# Patient Record
Sex: FEMALE | Race: WHITE | NOT HISPANIC OR LATINO | Employment: OTHER | URBAN - METROPOLITAN AREA
[De-identification: names, ages, dates, MRNs, and addresses within clinical notes are randomized per-mention and may not be internally consistent; named-entity substitution may affect disease eponyms.]

---

## 2017-05-15 ENCOUNTER — NON-PROVIDER VISIT (OUTPATIENT)
Dept: WOUND CARE | Facility: MEDICAL CENTER | Age: 77
End: 2017-05-15
Attending: NURSE PRACTITIONER
Payer: MEDICARE

## 2017-05-15 NOTE — WOUND TEAM
"Wound Team    Moraima Wilson (MR# [id number])         Wound Team Info      Author Note Status Last Update User Last Update Date/Time     Sneha Valladares R.N. Signed Sneha Valladares R.N.        Wound Team      Expand All Collapse All    Advanced Wound Care  [facility]  [address]  [place] [address]  [phone] Fax#    Initial Ostomy Evaluation  For 90 Day Certification Period:  05/15/2017-08/15/2017    Referring Provider:    Primary Provider:   Consulting Providers:  Surgeon:      Start of Care: 05/15/2017  Reason for referral: Appliances have been leaking, causing pain and peristomal skin breakdown.    SUBJECTIVE:  Follow up with present pouch system due to leakage and lasting one to three days.      HPI: A 74 year old white female was referred to the wound and ostomy clinic for assessment of her stoma, peristomal skin and appliance.    PMH: Insomnia, esophageal reflux, hypothyroidism, ulcerative colitis history, GERD, diarrhea, B12 deficiency, polio at age 8 years, asthma, OB, anxiety.    Surgical Hx: Total colectomy and ileostomy secondary to severe ulcerative colitis in 1965. Lumbar laminectomy, thyroidectomy, kidney stone removal from left kidney, D & C    Medications:   Allergies: Quinine sulfate, platelets drop, salt citric acid preservatives (The patient said that she breaks out in hives).    Soc. Never a smoker, negative for Drug/Alcohol usage. .    OBJECTIVE: To provide a pouching system that will not leak or find the pouch problem that is causing the leakage of stool.    STOMA ASSESSMENT:              Type:  _X___Ileostomy     ____Colostomy    ____Urostomy    ____Other                Location:   Right lower abdomen              Size: 3/4\"              Shape: Round              Color: Moist and red              Protrudes:      _X__ >1 inch                             OS:  Center              Mucocutaneous Junction: Resolved              Skin " "indentations, creases or scar tissue: Peristomal skin is retracted when the patient sits up, the stoma still protrudes above the skin level. The surrounding skin protrudes over the superior aspect of the stoma from the abdomen due to weight loss which is pinched off leaving a gap in the wafer. The patient will need deep convexity to seal around the stoma and prevent leakage and skin problems.              Angie-stomal Skin Problems: Red irritation              Effluent / Flatus: loose brown              Photo  __X__ Yes ____ No    Pouching Procedure:              Old appliance removed: A one piece convex presized 7/8\" opening Nu-Hope drainage pouch with [employer] paste removed.                 Peristomal skin cleansed with: Warm water and pat dry.              Peristomal skin treated with: Crusted with no-sting skin prep and stoma powder x2.              Ostomy appliance used:  Applied a one piece Nu-Hope with a convex presized 7/8\" opening, #  S-C SP, used paste ring for additional adhesion and flexibility with skin.           Patient Education: Verbal/demonstration instructions of above pouching procedure provided to patient/family. Demonstrated to patient how to crust peristomal skin, then had patient perform crusting on the back of this RN glove. Demonstrated how to use paste ring. Patient using belt to help secure appliance. Patient can order no sting and paste rings through [employer].                           PLAN See above    Supplies Needed:              Appliance type:  Other: Nu-Hope one piece convex presized 7/8\" opening fecal pouch.                Accessories:   Paste ring and ostomy belt                Supplier: [employer]    Frequency:  Changes the pouch system 2x's per week or prn leakage.      Professional Collaboration: None at this time.      At the time of each visit, a thorough assessment of the patient is completed to assure appropriateness of our plan of care.  The plan of care may need to be " adapted from the original plan of care to address any significant changes in patient status.    Clinician Signature:  ________________________________  Date:  ____________    Physician Signature:  ________________________________  Date:  ___________

## 2017-06-01 ENCOUNTER — APPOINTMENT (OUTPATIENT)
Dept: WOUND CARE | Facility: MEDICAL CENTER | Age: 77
End: 2017-06-01
Attending: NURSE PRACTITIONER
Payer: MEDICARE

## 2017-10-19 PROBLEM — R55 SYNCOPE AND COLLAPSE: Status: ACTIVE | Noted: 2017-10-19

## 2017-10-19 PROBLEM — E86.0 DEHYDRATION: Status: ACTIVE | Noted: 2017-10-19

## 2017-10-19 PROBLEM — S32.591A BILATERAL PUBIC RAMI FRACTURES (HCC): Status: ACTIVE | Noted: 2017-10-19

## 2017-10-19 PROBLEM — N39.0 UTI (URINARY TRACT INFECTION): Status: ACTIVE | Noted: 2017-10-19

## 2017-10-19 PROBLEM — S32.592A BILATERAL PUBIC RAMI FRACTURES (HCC): Status: ACTIVE | Noted: 2017-10-19

## 2017-12-07 PROBLEM — Z96.631: Status: ACTIVE | Noted: 2017-12-07

## 2017-12-07 PROBLEM — Z47.1: Status: ACTIVE | Noted: 2017-12-07

## 2018-02-06 ENCOUNTER — TELEPHONE (OUTPATIENT)
Dept: CARDIOLOGY | Facility: CLINIC | Age: 78
End: 2018-02-06

## 2018-02-06 NOTE — TELEPHONE ENCOUNTER
LVM for patient to call me back for a few questions prior to her appointment with CW in  on 2/13/18.

## 2018-02-13 ENCOUNTER — OFFICE VISIT (OUTPATIENT)
Dept: CARDIOLOGY | Facility: CLINIC | Age: 78
End: 2018-02-13
Payer: MEDICARE

## 2018-02-13 VITALS
HEART RATE: 72 BPM | SYSTOLIC BLOOD PRESSURE: 100 MMHG | WEIGHT: 120 LBS | OXYGEN SATURATION: 91 % | DIASTOLIC BLOOD PRESSURE: 70 MMHG | HEIGHT: 67 IN | BODY MASS INDEX: 18.83 KG/M2

## 2018-02-13 DIAGNOSIS — R55 SYNCOPE AND COLLAPSE: ICD-10-CM

## 2018-02-13 PROCEDURE — 93000 ELECTROCARDIOGRAM COMPLETE: CPT | Performed by: INTERNAL MEDICINE

## 2018-02-13 PROCEDURE — 99214 OFFICE O/P EST MOD 30 MIN: CPT | Performed by: INTERNAL MEDICINE

## 2018-02-13 RX ORDER — ZOLPIDEM TARTRATE 5 MG/1
5 TABLET ORAL NIGHTLY PRN
COMMUNITY
End: 2018-12-14

## 2018-02-13 RX ORDER — OXYBUTYNIN CHLORIDE 5 MG/1
5 TABLET ORAL 3 TIMES DAILY
COMMUNITY
End: 2018-12-14

## 2018-02-13 RX ORDER — SODIUM BICARBONATE 650 MG/1
650 TABLET ORAL 4 TIMES DAILY
COMMUNITY
End: 2018-07-23

## 2018-02-13 ASSESSMENT — ENCOUNTER SYMPTOMS
WEAKNESS: 0
SORE THROAT: 0
FALLS: 1
NERVOUS/ANXIOUS: 1
SHORTNESS OF BREATH: 0
DIZZINESS: 1
DIARRHEA: 1
BLURRED VISION: 1
BRUISES/BLEEDS EASILY: 0
FEVER: 0
LOSS OF CONSCIOUSNESS: 1
ORTHOPNEA: 1
PALPITATIONS: 0
FOCAL WEAKNESS: 0
DEPRESSION: 1
PND: 0
COUGH: 0
MEMORY LOSS: 1
CHILLS: 0
ABDOMINAL PAIN: 0
BACK PAIN: 1
NAUSEA: 0
CLAUDICATION: 0

## 2018-02-13 NOTE — PROGRESS NOTES
Subjective:   Moraima Wilson is a 78 y.o. female who presents today for follow-up with a history of syncope which was felt to do dehydration possible UTI in October 2017 echo and carotid were normal telemetry in the hospital was normal EKG normal    She requires regular infusions of fluid and magnesium given the long history of ileostomy    She does describe on occasion some near syncopal events she felt this was related to her head position    Past Medical History:   Diagnosis Date   • Back pain    • GERD (gastroesophageal reflux disease)    • Ileostomy in place (CMS-HCC)    • Kidney disease     stage 3   • Nephrolithiasis     with obstruction Dec 2016   • Right wrist fracture 9/4/2015    fell walking in driveway   • Thyroid disease    • Ulcerative colitis (CMS-[facility])      Past Surgical History:   Procedure Laterality Date   • STENT PLACEMENT  dec 2016    both kidneys, removed 3 months later   • LUMBAR FUSION POSTERIOR  2005    Dr CHAD Wilson   • ILEOSTOMY  1965   • CARPAL TUNNEL RELEASE     • HAND SURGERY       History reviewed. No pertinent family history.  History   Smoking Status   • Never Smoker   Smokeless Tobacco   • Never Used     Allergies   Allergen Reactions   • Contrast Media With Iodine [Iodine]      Pt has kidney disease     • Quinine      platelets decrease     Outpatient Encounter Prescriptions as of 2/13/2018   Medication Sig Dispense Refill   • zolpidem (AMBIEN) 5 MG Tab Take 5 mg by mouth at bedtime as needed for Sleep.     • sodium bicarbonate (SODIUM BICARBONATE) 650 MG Tab Take 650 mg by mouth 4 times a day.     • CHLORZOXAZONE PO Take  by mouth as needed.     • oxybutynin (DITROPAN) 5 MG Tab Take 5 mg by mouth 3 times a day.     • hydrocodone/acetaminophen (NORCO)  MG Tab Take 1 Tab by mouth every 8 hours as needed for Moderate Pain. 20 Tab 0   • Fe Bisgly-Succ-C-Thre-B12-FA (IRON-150 PO) Take 65 mg by mouth every day.     • busPIRone (BUSPAR) 15 MG tablet Take 15 mg by mouth 2 times a day.      • Cyanocobalamin 2500 MCG Tab Take 1 Tab by mouth every day.     • paroxetine (PAXIL) 20 MG Tab Take 20 mg by mouth every day.     • salmeterol (SEREVENT) 50 MCG/DOSE AEROSOL POWDER, BREATH ACTIVATED Inhale 1 Puff by mouth 2 times a day.     • CALCIUM PO Take 750 mg by mouth every day. OTC     • gabapentin (NEURONTIN) 300 MG Cap Take 300 mg by mouth every day.     • estradiol (VAGIFEM) 10 MCG Tab Insert 10 mcg in vagina 2X A WEEK.     • diphenoxylate-atropine (LOMOTIL) 2.5-0.025 MG Tab Take 1 Tab by mouth 3 times a day as needed for Diarrhea.     • montelukast (SINGULAIR) 10 MG Tab Take 10 mg by mouth every evening.     • vitamin D (CHOLECALCIFEROL) 1000 UNIT Tab Take 2,000 Units by mouth every day.     • estradiol (ESTRACE VAGINAL) 0.1 MG/GM vaginal cream Insert 1 g in vagina every day.     • fluticasone (FLOVENT HFA) 110 MCG/ACT Aerosol Inhale 2 Puffs by mouth 2 times a day.     • levothyroxine (SYNTHROID) 100 MCG Tab Take 100 mcg by mouth Every morning on an empty stomach.     • lactobacillus acidophilus (BACID) Tab Take 1 Tab by mouth 3 times a day, with meals. (Patient not taking: Reported on 2/13/2018) 30 Tab 0   • levofloxacin (LEVAQUIN) 250 MG Tab Take 1 Tab by mouth every 24 hours. (Patient not taking: Reported on 2/13/2018) 5 Tab 0   • magnesium sulfate 2 GM/50ML Solution IVPB premix 2 g by Intravenous route every 7 days. Infusion center patient       No facility-administered encounter medications on file as of 2/13/2018.      Review of Systems   Constitutional: Positive for malaise/fatigue. Negative for chills and fever.   HENT: Positive for hearing loss. Negative for sore throat.    Eyes: Positive for blurred vision.   Respiratory: Negative for cough and shortness of breath.    Cardiovascular: Positive for chest pain and orthopnea. Negative for palpitations, claudication, leg swelling and PND.   Gastrointestinal: Positive for diarrhea. Negative for abdominal pain and nausea.   Genitourinary: Positive  "for frequency and urgency.   Musculoskeletal: Positive for back pain, falls and joint pain.   Skin: Negative for rash.   Neurological: Positive for dizziness and loss of consciousness. Negative for focal weakness and weakness.   Endo/Heme/Allergies: Positive for environmental allergies. Does not bruise/bleed easily.   Psychiatric/Behavioral: Positive for depression and memory loss. The patient is nervous/anxious.         Objective:   /70   Pulse 72   Ht 1.702 m (5' 7\")   Wt 54.4 kg (120 lb)   LMP 03/28/1977 (Approximate)   SpO2 91%   BMI 18.79 kg/m²     Physical Exam   Constitutional: No distress.   HENT:   Mouth/Throat: Oropharynx is clear and moist.   Eyes: No scleral icterus.   Cardiovascular: Normal rate, regular rhythm and intact distal pulses.  Exam reveals no gallop and no friction rub.    No murmur heard.  Pulmonary/Chest: Effort normal and breath sounds normal. She has no rales.   Abdominal: Bowel sounds are normal. There is no tenderness.   Musculoskeletal: She exhibits no edema.   Neurological: She is alert.   Skin: No rash noted. She is not diaphoretic.   Psychiatric: She has a normal mood and affect.     EKG shows sinus rhythm with normal axis and intervals aside from first-degree AV block    Echocardiogram in October showed normal function mild MR    Carotid ultrasound was within normal range    Labs show a long-standing chronic kidney disease overall improved in the past year on therapy    Hypomagnesemia stable on therapy  Assessment:     1. Syncope and collapse  EKG       Medical Decision Making:  Today's Assessment / Status / Plan:     It was my pleasure to meet with Ms. Wilson.    She is accompanied by her sister    For her history of syncope this seemed to be due to dehydration and possible UTI encouraged her to follow up with urology about her concerns for frequent urinary infections    She reports that she is doing well on her infusion therapy with magnesium    Overall have a low " suspicion for arrhythmia causing her syncope certainly she had new symptoms or recurrent syncope we could explore Holter event or loop recorder monitoring    Ms. Wilson does not require regular cardiology follow up, I have advised her to call our office or e-mail using my MyChart if needed.    It is my pleasure to participate in the care of Ms. Wilson.  Please do not hesitate to contact me with questions or concerns.    Huy Vick MD PhD FAC  Cardiologist [facility]

## 2018-02-13 NOTE — LETTER
[facility] [place] [address],   [address]nd [place] [address]  Phone: [phone]  Fax: [phone]              Moraima Wilson  1940    Encounter Date: 2/13/2018    Huy Vick M.D.          PROGRESS NOTE:  Subjective:   Moraima Wilson is a 78 y.o. female who presents today for follow-up with a history of syncope which was felt to do dehydration possible UTI in October 2017 echo and carotid were normal telemetry in the hospital was normal EKG normal    She requires regular infusions of fluid and magnesium given the long history of ileostomy    She does describe on occasion some near syncopal events she felt this was related to her head position    Past Medical History:   Diagnosis Date   • Back pain    • GERD (gastroesophageal reflux disease)    • Ileostomy in place (CMS-HCC)    • Kidney disease     stage 3   • Nephrolithiasis     with obstruction Dec 2016   • Right wrist fracture 9/4/2015    fell walking in driveway   • Thyroid disease    • Ulcerative colitis (CMS-HCC)      Past Surgical History:   Procedure Laterality Date   • STENT PLACEMENT  dec 2016    both kidneys, removed 3 months later   • LUMBAR FUSION POSTERIOR  2005    Dr CHAD Wilson   • ILEOSTOMY  1965   • CARPAL TUNNEL RELEASE     • HAND SURGERY       History reviewed. No pertinent family history.  History   Smoking Status   • Never Smoker   Smokeless Tobacco   • Never Used     Allergies   Allergen Reactions   • Contrast Media With Iodine [Iodine]      Pt has kidney disease     • Quinine      platelets decrease     Outpatient Encounter Prescriptions as of 2/13/2018   Medication Sig Dispense Refill   • zolpidem (AMBIEN) 5 MG Tab Take 5 mg by mouth at bedtime as needed for Sleep.     • sodium bicarbonate (SODIUM BICARBONATE) 650 MG Tab Take 650 mg by mouth 4 times a day.     • CHLORZOXAZONE PO Take  by mouth as needed.     • oxybutynin (DITROPAN) 5 MG Tab Take 5 mg by mouth 3  times a day.     • hydrocodone/acetaminophen (NORCO)  MG Tab Take 1 Tab by mouth every 8 hours as needed for Moderate Pain. 20 Tab 0   • Fe Bisgly-Succ-C-Thre-B12-FA (IRON-150 PO) Take 65 mg by mouth every day.     • busPIRone (BUSPAR) 15 MG tablet Take 15 mg by mouth 2 times a day.     • Cyanocobalamin 2500 MCG Tab Take 1 Tab by mouth every day.     • paroxetine (PAXIL) 20 MG Tab Take 20 mg by mouth every day.     • salmeterol (SEREVENT) 50 MCG/DOSE AEROSOL POWDER, BREATH ACTIVATED Inhale 1 Puff by mouth 2 times a day.     • CALCIUM PO Take 750 mg by mouth every day. OTC     • gabapentin (NEURONTIN) 300 MG Cap Take 300 mg by mouth every day.     • estradiol (VAGIFEM) 10 MCG Tab Insert 10 mcg in vagina 2X A WEEK.     • diphenoxylate-atropine (LOMOTIL) 2.5-0.025 MG Tab Take 1 Tab by mouth 3 times a day as needed for Diarrhea.     • montelukast (SINGULAIR) 10 MG Tab Take 10 mg by mouth every evening.     • vitamin D (CHOLECALCIFEROL) 1000 UNIT Tab Take 2,000 Units by mouth every day.     • estradiol (ESTRACE VAGINAL) 0.1 MG/GM vaginal cream Insert 1 g in vagina every day.     • fluticasone (FLOVENT HFA) 110 MCG/ACT Aerosol Inhale 2 Puffs by mouth 2 times a day.     • levothyroxine (SYNTHROID) 100 MCG Tab Take 100 mcg by mouth Every morning on an empty stomach.     • lactobacillus acidophilus (BACID) Tab Take 1 Tab by mouth 3 times a day, with meals. (Patient not taking: Reported on 2/13/2018) 30 Tab 0   • levofloxacin (LEVAQUIN) 250 MG Tab Take 1 Tab by mouth every 24 hours. (Patient not taking: Reported on 2/13/2018) 5 Tab 0   • magnesium sulfate 2 GM/50ML Solution IVPB premix 2 g by Intravenous route every 7 days. Infusion center patient       No facility-administered encounter medications on file as of 2/13/2018.      Review of Systems   Constitutional: Positive for malaise/fatigue. Negative for chills and fever.   HENT: Positive for hearing loss. Negative for sore throat.    Eyes: Positive for blurred  "vision.   Respiratory: Negative for cough and shortness of breath.    Cardiovascular: Positive for chest pain and orthopnea. Negative for palpitations, claudication, leg swelling and PND.   Gastrointestinal: Positive for diarrhea. Negative for abdominal pain and nausea.   Genitourinary: Positive for frequency and urgency.   Musculoskeletal: Positive for back pain, falls and joint pain.   Skin: Negative for rash.   Neurological: Positive for dizziness and loss of consciousness. Negative for focal weakness and weakness.   Endo/Heme/Allergies: Positive for environmental allergies. Does not bruise/bleed easily.   Psychiatric/Behavioral: Positive for depression and memory loss. The patient is nervous/anxious.         Objective:   /70   Pulse 72   Ht 1.702 m (5' 7\")   Wt 54.4 kg (120 lb)   LMP 03/28/1977 (Approximate)   SpO2 91%   BMI 18.79 kg/m²      Physical Exam   Constitutional: No distress.   HENT:   Mouth/Throat: Oropharynx is clear and moist.   Eyes: No scleral icterus.   Cardiovascular: Normal rate, regular rhythm and intact distal pulses.  Exam reveals no gallop and no friction rub.    No murmur heard.  Pulmonary/Chest: Effort normal and breath sounds normal. She has no rales.   Abdominal: Bowel sounds are normal. There is no tenderness.   Musculoskeletal: She exhibits no edema.   Neurological: She is alert.   Skin: No rash noted. She is not diaphoretic.   Psychiatric: She has a normal mood and affect.     EKG shows sinus rhythm with normal axis and intervals aside from first-degree AV block    Echocardiogram in October showed normal function mild MR    Carotid ultrasound was within normal range    Labs show a long-standing chronic kidney disease overall improved in the past year on therapy    Hypomagnesemia stable on therapy  Assessment:     1. Syncope and collapse  EKG       Medical Decision Making:  Today's Assessment / Status / Plan:     It was my pleasure to meet with Ms. Wilson.    She is " accompanied by her sister    For her history of syncope this seemed to be due to dehydration and possible UTI encouraged her to follow up with urology about her concerns for frequent urinary infections    She reports that she is doing well on her infusion therapy with magnesium    Overall have a low suspicion for arrhythmia causing her syncope certainly she had new symptoms or recurrent syncope we could explore Holter event or loop recorder monitoring    Ms. Wilson does not require regular cardiology follow up, I have advised her to call our office or e-mail using my MyChart if needed.    It is my pleasure to participate in the care of Ms. Wilson.  Please do not hesitate to contact me with questions or concerns.    Huy Vick MD PhD [facility]  Cardiologist [facility] and V[facility]        Paty Brito M.D.  [address] [address]  VIA Facsimile: [phone]     Karla Arora M.D.  [address]  [facility] [address]  VIA Facsimile: [phone]     Harshal Herring M.D.  [address] [address]  VIA Facsimile: [phone]     [facility]  VIA Facsimile: [phone]     Jaiden Cardona M.D.  [address] [address]  VIA Facsimile: [phone]

## 2018-02-14 LAB — EKG IMPRESSION: NORMAL

## 2020-06-22 PROBLEM — E53.8 ADENOSYLCOBALAMIN SYNTHESIS DEFECT: Status: ACTIVE | Noted: 2020-06-22

## 2020-06-22 PROBLEM — E83.42 HYPOMAGNESEMIA: Status: ACTIVE | Noted: 2020-06-22

## 2021-01-12 DIAGNOSIS — Z23 NEED FOR VACCINATION: ICD-10-CM

## 2021-02-26 PROBLEM — K51.90 ULCERATIVE COLITIS (HCC): Status: ACTIVE | Noted: 2020-12-10

## 2021-02-26 PROBLEM — G30.1 LATE ONSET ALZHEIMER'S DISEASE WITH BEHAVIORAL DISTURBANCE (HCC): Status: ACTIVE | Noted: 2021-02-26

## 2021-02-26 PROBLEM — M96.1 FAILED BACK SYNDROME OF LUMBAR SPINE: Status: ACTIVE | Noted: 2020-12-10

## 2021-02-26 PROBLEM — R46.89 WANDERING: Status: ACTIVE | Noted: 2021-02-26

## 2021-02-26 PROBLEM — N18.4 STAGE 4 CHRONIC KIDNEY DISEASE (HCC): Status: ACTIVE | Noted: 2021-02-26

## 2021-02-26 PROBLEM — J45.909 ASTHMA: Status: ACTIVE | Noted: 2021-02-26

## 2021-02-26 PROBLEM — N39.0 UTI (URINARY TRACT INFECTION): Status: RESOLVED | Noted: 2017-10-19 | Resolved: 2021-02-26

## 2021-02-26 PROBLEM — Z96.631: Status: RESOLVED | Noted: 2017-12-07 | Resolved: 2021-02-26

## 2021-02-26 PROBLEM — M25.569 CHRONIC KNEE PAIN: Status: ACTIVE | Noted: 2020-12-10

## 2021-02-26 PROBLEM — F51.5 NIGHTMARES: Status: ACTIVE | Noted: 2021-02-26

## 2021-02-26 PROBLEM — Z47.1: Status: RESOLVED | Noted: 2017-12-07 | Resolved: 2021-02-26

## 2021-02-26 PROBLEM — K21.9 GASTROESOPHAGEAL REFLUX DISEASE: Status: ACTIVE | Noted: 2020-12-10

## 2021-02-26 PROBLEM — F02.818 LATE ONSET ALZHEIMER'S DISEASE WITH BEHAVIORAL DISTURBANCE (HCC): Status: ACTIVE | Noted: 2021-02-26

## 2021-02-26 PROBLEM — M81.0 AGE-RELATED OSTEOPOROSIS WITHOUT CURRENT PATHOLOGICAL FRACTURE: Status: ACTIVE | Noted: 2021-02-26

## 2021-02-26 PROBLEM — G89.29 CHRONIC KNEE PAIN: Status: ACTIVE | Noted: 2020-12-10

## 2021-02-26 PROBLEM — E86.0 DEHYDRATION: Status: RESOLVED | Noted: 2017-10-19 | Resolved: 2021-02-26

## 2021-02-26 PROBLEM — E03.9 ACQUIRED HYPOTHYROIDISM: Status: ACTIVE | Noted: 2021-02-26

## 2021-02-26 PROBLEM — N32.81 OAB (OVERACTIVE BLADDER): Status: ACTIVE | Noted: 2021-02-26

## 2021-02-26 PROBLEM — E53.8 VITAMIN B12 DEFICIENCY: Status: ACTIVE | Noted: 2021-02-26

## 2021-05-20 PROBLEM — Z97.8 PRESENCE OF INTRATHECAL PUMP: Status: ACTIVE | Noted: 2021-05-06
